# Patient Record
(demographics unavailable — no encounter records)

---

## 2025-06-02 NOTE — PHYSICAL EXAM
Patient fell getting out of bed this evening. She denies hitting her head and had no LOC.  She now has left hip pain which is shorten and rotated . She is able to move her left toes,she has positive left pedal pulse . Patient has several bruising on her body due to a fall one week ago. She also has an old woud to her right upper arm       [Right] : right wrist [Outside films reviewed] : Outside films reviewed

## 2025-06-02 NOTE — DATA REVIEWED
[Outside X-rays] : outside x-rays [Right] : of the right [Wrist] : wrist [I reviewed the films/CD] : I reviewed the films/CD

## 2025-06-13 NOTE — DISCUSSION/SUMMARY
[de-identified] : Discussed the nature of the diagnosis and risk and benefits of different modalities of treatment. RT nondisplaced scaphoid waist fx\ CT reviewed & discussed  X rays reviewed & discussed She will maintain splint  She is OOW RTO 4 weeks

## 2025-06-13 NOTE — DATA REVIEWED
[CT Scan] : CT scan [Right] : of the right [Wrist] : wrist [I independently reviewed and interpreted images and report] : I independently reviewed and interpreted images and report [I reviewed the films/CD and agree] : I reviewed the films/CD and agree [FreeTextEntry1] : Fx is nondisplaced

## 2025-06-13 NOTE — PHYSICAL EXAM
[Right] : right wrist [FreeTextEntry3] : Decreased swelling  [FreeTextEntry8] : fx line visible remains nondisplaced

## 2025-06-13 NOTE — HISTORY OF PRESENT ILLNESS
[0] : 0 [de-identified] : 61 year old female followed for RT nondisplaced scaphoid waist fx..cervradShe has been splinting .She returns with CT.   DOI: 5/28/25  [] : no [FreeTextEntry1] : right wrist  [FreeTextEntry3] : 5/28/25 [FreeTextEntry5] : Patient had a fall in [place], put right hand out to brace herself. Fx  went to  clinic in [place] [FreeTextEntry9] : splint  [de-identified] : xray [facility] [de-identified] : splint

## 2025-07-11 NOTE — PHYSICAL EXAM
[Right] : right wrist [The fracture is in acceptable alignment. There is progression in healing seen] : The fracture is in acceptable alignment. There is progression in healing seen [de-identified] : Mild snuff box & dorsal wrist  [FreeTextEntry8] : Progression in healing

## 2025-07-11 NOTE — HISTORY OF PRESENT ILLNESS
[de-identified] : 61 year old female followed for RT nondisplaced scaphoid waist fx..She has been splinting. CT has previously been reviewed & discussed.   DOI: 5/28/25  [] : no [FreeTextEntry1] : R wrist  [FreeTextEntry5] : pt states pain is improving.

## 2025-07-11 NOTE — DISCUSSION/SUMMARY
[de-identified] : Discussed the nature of the diagnosis and risk and benefits of different modalities of treatment. RT nondisplaced scaphoid waist fx  X rays reviewed & discussed She will d/c splint  She will start OT Rx provided  She will RTW full duty 7/14/25 RTO 4 weeks